# Patient Record
Sex: MALE | URBAN - METROPOLITAN AREA
[De-identification: names, ages, dates, MRNs, and addresses within clinical notes are randomized per-mention and may not be internally consistent; named-entity substitution may affect disease eponyms.]

---

## 2017-11-28 ENCOUNTER — APPOINTMENT (OUTPATIENT)
Age: 52
Setting detail: DERMATOLOGY
End: 2017-11-28

## 2017-11-28 VITALS — WEIGHT: 230 LBS | HEIGHT: 69 IN

## 2017-11-28 DIAGNOSIS — L663 OTHER SPECIFIED DISEASES OF HAIR AND HAIR FOLLICLES: ICD-10-CM

## 2017-11-28 DIAGNOSIS — L738 OTHER SPECIFIED DISEASES OF HAIR AND HAIR FOLLICLES: ICD-10-CM

## 2017-11-28 DIAGNOSIS — L57.8 OTHER SKIN CHANGES DUE TO CHRONIC EXPOSURE TO NONIONIZING RADIATION: ICD-10-CM

## 2017-11-28 PROBLEM — L02.424 FURUNCLE OF LEFT UPPER LIMB: Status: ACTIVE | Noted: 2017-11-28

## 2017-11-28 PROBLEM — L02.423 FURUNCLE OF RIGHT UPPER LIMB: Status: ACTIVE | Noted: 2017-11-28

## 2017-11-28 PROBLEM — E13.9 OTHER SPECIFIED DIABETES MELLITUS WITHOUT COMPLICATIONS: Status: ACTIVE | Noted: 2017-11-28

## 2017-11-28 PROBLEM — L70.0 ACNE VULGARIS: Status: ACTIVE | Noted: 2017-11-28

## 2017-11-28 PROBLEM — L02.222 FURUNCLE OF BACK [ANY PART, EXCEPT BUTTOCK]: Status: ACTIVE | Noted: 2017-11-28

## 2017-11-28 PROBLEM — L02.223 FURUNCLE OF CHEST WALL: Status: ACTIVE | Noted: 2017-11-28

## 2017-11-28 PROBLEM — I10 ESSENTIAL (PRIMARY) HYPERTENSION: Status: ACTIVE | Noted: 2017-11-28

## 2017-11-28 PROCEDURE — 99203 OFFICE O/P NEW LOW 30 MIN: CPT

## 2017-11-28 PROCEDURE — OTHER MIPS QUALITY: OTHER

## 2017-11-28 PROCEDURE — OTHER PRESCRIPTION: OTHER

## 2017-11-28 PROCEDURE — OTHER TREATMENT REGIMEN: OTHER

## 2017-11-28 PROCEDURE — OTHER SUNSCREEN RECOMMENDATIONS: OTHER

## 2017-11-28 RX ORDER — CLINDAMYCIN PHOSPHATE 10 MG/G
THIN COAT GEL TOPICAL
Qty: 1 | Refills: 1 | Status: ERX | COMMUNITY
Start: 2017-11-28

## 2017-11-28 RX ORDER — DOXYCYCLINE HYCLATE 100 MG/1
100 MG CAPSULE, GELATIN COATED ORAL BID
Qty: 60 | Refills: 1 | Status: ERX | COMMUNITY
Start: 2017-11-28

## 2017-11-28 ASSESSMENT — LOCATION SIMPLE DESCRIPTION DERM
LOCATION SIMPLE: RIGHT UPPER BACK
LOCATION SIMPLE: RIGHT FOREARM
LOCATION SIMPLE: CHEST
LOCATION SIMPLE: LEFT FOREARM

## 2017-11-28 ASSESSMENT — LOCATION ZONE DERM
LOCATION ZONE: ARM
LOCATION ZONE: TRUNK

## 2017-11-28 ASSESSMENT — LOCATION DETAILED DESCRIPTION DERM
LOCATION DETAILED: LEFT PROXIMAL DORSAL FOREARM
LOCATION DETAILED: STERNUM
LOCATION DETAILED: RIGHT MEDIAL UPPER BACK
LOCATION DETAILED: RIGHT PROXIMAL DORSAL FOREARM

## 2017-11-28 ASSESSMENT — BSA RASH: BSA RASH: 8

## 2017-11-28 ASSESSMENT — SEVERITY ASSESSMENT: SEVERITY: MODERATE

## 2017-11-28 NOTE — PROCEDURE: MIPS QUALITY
Quality 110: Preventive Care And Screening: Influenza Immunization: Influenza Immunization Ordered or Recommended, but not Administered due to system reason
Detail Level: Detailed
Quality 47: Advance Care Plan: Advance care planning not documented, reason not otherwise specified.
Quality 111:Pneumonia Vaccination Status For Older Adults: Pneumococcal Vaccination not Administered or Previously Received, Reason not Otherwise Specified

## 2017-11-28 NOTE — PROCEDURE: TREATMENT REGIMEN
Detail Level: Generalized
Plan: This appears consistent with an aceiform eruption, possible folliculitis. Will treat for a bacterial folliculitis however if no improvement I think biopsy might be in order as this could represent fungal component or some other inflammatory condition
Detail Level: Zone
Plan: Normal basic skin exam today, no evidence of any pre-cancers or skin cancers. Patient will need to follow up annually for full body skin exam

## 2017-11-28 NOTE — HPI: BUMPS
How Severe Are Your Bumps?: moderate
Have Your Bumps Been Treated?: not been treated
Is This A New Presentation, Or A Follow-Up?: Bumps
Additional History: Patient has tried numerous over-the-counter topical remedies, he has even ordered several rounds of permethrin thinking he might have had scabies although he has no known exposure to scabies, no other close contacts are affected by anything similar. No specific alleviating or aggravating factors. His primary complaint is red slightly irritated, tender bumps primarily on the back